# Patient Record
Sex: MALE | Race: WHITE | Employment: OTHER | URBAN - METROPOLITAN AREA
[De-identification: names, ages, dates, MRNs, and addresses within clinical notes are randomized per-mention and may not be internally consistent; named-entity substitution may affect disease eponyms.]

---

## 2019-05-16 ENCOUNTER — TRANSFERRED RECORDS (OUTPATIENT)
Dept: HEALTH INFORMATION MANAGEMENT | Facility: CLINIC | Age: 79
End: 2019-05-16

## 2019-05-17 ENCOUNTER — MEDICAL CORRESPONDENCE (OUTPATIENT)
Dept: HEALTH INFORMATION MANAGEMENT | Facility: CLINIC | Age: 79
End: 2019-05-17

## 2019-05-21 ENCOUNTER — TRANSFERRED RECORDS (OUTPATIENT)
Dept: HEALTH INFORMATION MANAGEMENT | Facility: CLINIC | Age: 79
End: 2019-05-21

## 2019-05-28 ENCOUNTER — TRANSFERRED RECORDS (OUTPATIENT)
Dept: HEALTH INFORMATION MANAGEMENT | Facility: CLINIC | Age: 79
End: 2019-05-28

## 2019-05-29 ENCOUNTER — TRANSFERRED RECORDS (OUTPATIENT)
Dept: HEALTH INFORMATION MANAGEMENT | Facility: CLINIC | Age: 79
End: 2019-05-29

## 2019-05-30 NOTE — TELEPHONE ENCOUNTER
ONCOLOGY INTAKE: Records Information      APPT INFORMATION:  Referring provider:  Dr. Marisa Chaidez  Referring provider s clinic:  [facility]  Reason for visit/diagnosis:  Colon cancer  Has patient been notified of appointment date and time?: yes - his care coordinator made the appointment    RECORDS INFORMATION:  Were the records received with the referral (via [employer])? No - but the referral and records will be coming soon    Has patient been seen for any external appt for this diagnosis? yes    If yes, where? [facility]    Has patient had any imaging or procedures outside of [place] for this condition? yes      If Yes, where? [facility]

## 2019-05-31 NOTE — TELEPHONE ENCOUNTER
RECORDS STATUS - ALL OTHER DIAGNOSIS      RECORDS RECEIVED FROM: [facility] [place]   DATE RECEIVED: 06/10/2019   NOTES STATUS DETAILS   OFFICE NOTE from referring provider Received  Sent to scanning   OFFICE NOTE from medical oncologist yes Sent to scanning   DISCHARGE SUMMARY from hospital na    DISCHARGE REPORT from the ER na    OPERATIVE REPORT na    MEDICATION LIST yes Sent to scanning   CLINICAL TRIAL TREATMENTS TO DATE na    LABS yes Sent to scanning   PATHOLOGY REPORTS pending Slides requested reports sent to scanning   ANYTHING RELATED TO DIAGNOSIS na    GENONOMIC TESTING na    TYPE:     IMAGING (NEED IMAGES & REPORT) yes    CT SCANS     MRI     MAMMO     ULTRASOUND     PET yes Resolved in pacs     Action [place]   Action Taken Request sent to [facility]

## 2019-06-10 ENCOUNTER — PRE VISIT (OUTPATIENT)
Dept: ONCOLOGY | Facility: CLINIC | Age: 79
End: 2019-06-10

## 2019-06-10 ENCOUNTER — ONCOLOGY VISIT (OUTPATIENT)
Dept: ONCOLOGY | Facility: CLINIC | Age: 79
End: 2019-06-10
Attending: INTERNAL MEDICINE
Payer: COMMERCIAL

## 2019-06-10 VITALS
TEMPERATURE: 97.5 F | OXYGEN SATURATION: 95 % | HEART RATE: 75 BPM | DIASTOLIC BLOOD PRESSURE: 72 MMHG | WEIGHT: 220 LBS | SYSTOLIC BLOOD PRESSURE: 145 MMHG | RESPIRATION RATE: 16 BRPM | BODY MASS INDEX: 30.8 KG/M2 | HEIGHT: 71 IN

## 2019-06-10 DIAGNOSIS — C79.9 METASTASIS FROM RECTAL CANCER (H): Primary | ICD-10-CM

## 2019-06-10 DIAGNOSIS — C20 METASTASIS FROM RECTAL CANCER (H): Primary | ICD-10-CM

## 2019-06-10 PROCEDURE — 99205 OFFICE O/P NEW HI 60 MIN: CPT | Mod: ZP | Performed by: INTERNAL MEDICINE

## 2019-06-10 PROCEDURE — G0463 HOSPITAL OUTPT CLINIC VISIT: HCPCS | Mod: ZF

## 2019-06-10 RX ORDER — ASPIRIN 325 MG
325 TABLET ORAL
COMMUNITY
Start: 2018-03-27

## 2019-06-10 RX ORDER — CLOPIDOGREL BISULFATE 75 MG/1
75 TABLET ORAL
COMMUNITY
Start: 2018-03-27

## 2019-06-10 RX ORDER — SIMVASTATIN 40 MG
80 TABLET ORAL
COMMUNITY

## 2019-06-10 RX ORDER — TRAMADOL HYDROCHLORIDE 50 MG/1
50 TABLET ORAL EVERY 6 HOURS PRN
COMMUNITY

## 2019-06-10 RX ORDER — NAPROXEN SODIUM 220 MG
220 TABLET ORAL
COMMUNITY

## 2019-06-10 RX ORDER — AMLODIPINE BESYLATE 10 MG/1
10 TABLET ORAL
COMMUNITY

## 2019-06-10 SDOH — HEALTH STABILITY: MENTAL HEALTH: HOW OFTEN DO YOU HAVE A DRINK CONTAINING ALCOHOL?: 2-3 TIMES A WEEK

## 2019-06-10 SDOH — HEALTH STABILITY: MENTAL HEALTH: HOW MANY STANDARD DRINKS CONTAINING ALCOHOL DO YOU HAVE ON A TYPICAL DAY?: 1 OR 2

## 2019-06-10 SDOH — HEALTH STABILITY: MENTAL HEALTH: HOW OFTEN DO YOU HAVE 6 OR MORE DRINKS ON ONE OCCASION?: NEVER

## 2019-06-10 ASSESSMENT — PAIN SCALES - GENERAL: PAINLEVEL: MILD PAIN (3)

## 2019-06-10 ASSESSMENT — MIFFLIN-ST. JEOR: SCORE: 1725.04

## 2019-06-10 NOTE — NURSING NOTE
"Oncology Rooming Note    Cindy 10, 2019 11:37 AM   Luis Garcia is a 79 year old male who presents for:    Chief Complaint   Patient presents with     Oncology Clinic Visit     New Patient Visit for Colon Cancer     Initial Vitals: /72 (BP Location: Left arm, Patient Position: Chair, Cuff Size: Adult Regular)   Pulse 75   Temp 97.5  F (36.4  C) (Oral)   Resp 16   Ht 1.803 m (5' 11\")   Wt 99.8 kg (220 lb)   SpO2 95%   BMI 30.68 kg/m   Estimated body mass index is 30.68 kg/m  as calculated from the following:    Height as of this encounter: 1.803 m (5' 11\").    Weight as of this encounter: 99.8 kg (220 lb). Body surface area is 2.24 meters squared.  Mild Pain (3) Comment: Data Unavailable   No LMP for male patient.  Allergies reviewed: Yes  Medications reviewed: Yes    Medications: Medication refills not needed today.  Pharmacy name entered into [facility]: [facility] [address] [place] [address]    Clinical concerns: Here today with wife & daughters.  They want a second opinion on the diagnosis & second opinion on PET scan.  [facility] & [facility] have looked at scans & have somewhat similar diagnosis.  They are wondering if a liver biopsy is needed.  Two months ago patient had a \"little stroke\" & he was told he cannot be put to sleep for any biopsy or procedure.  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  He & his family are looking for guidance, diagnosis, & a plan of care.   Change was notified.      Carla Mcgee RN            "

## 2019-06-10 NOTE — PROGRESS NOTES
[facility]    Hematology/Oncology New Patient Note      Today's Date: 06/10/19    Reason for Consult: metastatic rectal cancer      HISTORY OF PRESENT ILLNESS: Luis Garcia is a 79 year old male who presents with metastatic rectal cancer.  He is here for a second opinion.  He has met with Dr. Marisa Chaidez at the [facility] Oncology service.  He has also been seen by pulmonary at the [facility].      On 4/2/19, he went to the [facility] ED with complaints of rectal/sacral pain/constipation.   Rectal exam revealed a mass.  Colonoscopy was recommended, which was done on 5/16/19; he showed a 10 cm mass at the rectum 1-15 cm from the anorectal verge.  Pathology on the rectal mass showed poorly differentiated carcinoma; no loss of nuclear expression of MMR proteins; low probability of microsatellite instability-high; LUKE mutation detected; BRAF mutation not detected.  CEA was 3.17.  On 5/21/19, he presented to urgent with rectal pain.  He was discharged with Tramadol.  CT c/a/p showed marked irregular wall thickening at the rectum, with numerous lymph nodes around the rectum.  There was a 12 cm hypodense lesion with ill-defined margins in the right lobe of the liver.  Innumerable nodules scattered throughout both lungs involving all lobes were seen.  There was also a 1 cm left adrenal gland nodule.  PET-CT on 5/28/19 showed a large rectal mass measuring 6 cm with marked metabolic activity in multiple perirectal lymph nodes measuring up to 9 mm also with increased metabolic activity consistent with lymph node metastases.  Findings consistent with hepatic and pulmonary metastases with likely a small left adrenal metastasis.    He saw Dr. Chaidez at the [facility].  He also saw pulmonary at the [facility].  It was felt a liver biopsy could be biopsied to prove metastatic disease.  However, patient and family are reluctant to do additional procedures, considering  his co-morbidities.  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  He has discussed capecitabine treatment with palliative intent with his oncologist.      Currently, Luis feels okay.  His rectal pain is controlled with Aleve and Tramadol.  He denies rectal bleeding symptoms.  He has a bowel movement daily.  He is eating and drinking okay.        REVIEW OF SYSTEMS:   14 point ROS was reviewed and is negative other than as noted above in HPI.       HOME MEDICATIONS:  Current Outpatient Medications   Medication Sig Dispense Refill     amLODIPine (NORVASC) 10 MG tablet Take 10 mg by mouth       aspirin (ASA) 325 MG tablet Take 325 mg by mouth       clopidogrel (PLAVIX) 75 MG tablet Take 75 mg by mouth       naproxen sodium (ANAPROX) 220 MG tablet Take 220 mg by mouth       simvastatin (ZOCOR) 40 MG tablet Take 80 mg by mouth       traMADol (ULTRAM) 50 MG tablet Take 50 mg by mouth every 6 hours as needed for severe pain           ALLERGIES:  No Known Allergies      PAST MEDICAL HISTORY:  Obesity  Bilateral posterior vitreous detachment  Cataract  Nonexudative age-related macular degeneration  Mixed conductive and sensorineural hearing loss, bilateral  Dry eyes  Presbyopia  Depressive disorder  Hypercholesterolemia  Tinnitus  Diabetes mellitus type 2  Transient cerebral ischemia  Benign essential hypertension  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      SOCIAL HISTORY:  Social History     Socioeconomic History     Marital status:      Spouse name: Not on file     Number of children: Not on file     Years of education: Not on file     Highest education level: Not on file   Occupational History     Not on file   Social Needs     Financial resource strain: Not on file     Food  "insecurity:     Worry: Not on file     Inability: Not on file     Transportation needs:     Medical: Not on file     Non-medical: Not on file   Tobacco Use     Smoking status: Former Smoker     Types: Cigarettes     Last attempt to quit: 6/10/1969     Years since quittin.0     Smokeless tobacco: Never Used   Substance and Sexual Activity     Alcohol use: Yes     Frequency: 2-3 times a week     Drinks per session: 1 or 2     Binge frequency: Never     Drug use: Never     Sexual activity: Not on file   Lifestyle     Physical activity:     Days per week: Not on file     Minutes per session: Not on file     Stress: Not on file   Relationships     Social connections:     Talks on phone: Not on file     Gets together: Not on file     Attends [employer] service: Not on file     Active member of club or organization: Not on file     Attends meetings of clubs or organizations: Not on file     Relationship status: Not on file     Intimate partner violence:     Fear of current or ex partner: Not on file     Emotionally abused: Not on file     Physically abused: Not on file     Forced sexual activity: Not on file   Other Topics Concern     Not on file   Social History Narrative     Not on file         FAMILY HISTORY:  Brother  with chronic leukemia, bladder cancer, and a head tumor.  Sister  with cervical cancer.        PHYSICAL EXAM:  Vital signs:  /72 (BP Location: Left arm, Patient Position: Chair, Cuff Size: Adult Regular)   Pulse 75   Temp 97.5  F (36.4  C) (Oral)   Resp 16   Ht 1.803 m (5' 11\")   Wt 99.8 kg (220 lb)   SpO2 95%   BMI 30.68 kg/m     ECO  GENERAL/CONSTITUTIONAL: No acute distress. Accompanied by wife, daughter, daughter-in-law.  EYES: No scleral icterus.  RESPIRATORY: Clear to auscultation bilaterally. No crackles or wheezing.   CARDIOVASCULAR: Regular rate and rhythm without murmurs, gallops, or rubs.  GASTROINTESTINAL: No tenderness. The patient has normal bowel sounds. No " guarding.    MUSCULOSKELETAL: Warm and well-perfused.  NEUROLOGIC: Alert, oriented, answers questions appropriately.  INTEGUMENTARY: No jaundice.      LABS:  None today.    [facility]      PATHOLOGY:  Rectal mass, biopsy 5/17/19:  -poorly differentiated carcinoma  -no loss of nuclear expression of MMR proteins; low probability of microsatellite instability-high  -LUKE mutation detected  -BRAF mutation not detected      IMAGING:  CT c/a/p 5/21/19:  Rectal mass with adjacent adenopathy.  Indeterminate lesions of the liver, left adrenal, and lungs that could represent metastases.      PET-CT 5/28/19:  FINDINGS:  NECK:    There is no abnormal metabolic activity within the neck.  There are no enlarged  cervical lymph nodes.        CHEST:    There are few scattered pulmonary nodules within the lungs.  The 2 largest  demonstrate increased metabolic activity consistent with pulmonary metastases.  For reference, the largest within the left upper lobe measures 8 mm (series 3,  image 107) with a maximum SUV of 5.67.  The largest on the right is in the  medial right upper lobe (image 131) with a maximum SUV of 3.12 and measures 9  mm.  A few other pulmonary nodules measure less than 4 mm under too small to  characterize by PET.  There is no supraclavicular, axillary or mediastinal  lymphadenopathy.    There also few pulmonary nodules which are likely developing calcification and  may represent old granulomatous disease.  There is mild vascular  atherosclerotic calcifications including atherosclerotic calcification of the  left anterior descending coronary artery.        ABDOMEN/PELVIS:    There is focally increased metabolic activity corresponding with a 1.4 cm  hypoattenuating lesion within the right hepatic lobe (series 3 image 180) with  a maximum SUV of 8.21.  There is otherwise heterogeneous uptake within the  liver and smaller lesions are not completely excluded but are not visible on  the noncontrast  CT.  The left adrenal gland contains an 8 mm indeterminate  nodule but with a small focus of increased metabolic activity with a max SUV of  4.34 (series 3, image 202).    As seen on prior CT there is an irregular rectal mass measuring at least 6 cm  with a maximum SUV of 22.47.  In addition there are multiple bilateral  perirectal lymph nodes.  For reference, one of the largest is at the 4 o'clock  left perirectal position measuring 1.7 x 1.1 cm with a maximum SUV of 6.81  (series 3, image 338).  More superiorly there is another left perirectal lymph  node approximately 3 o'clock position measuring 1.4 x 1.3 cm with a maximum SUV  of 3.72 (series 3, image 321).  Multiple smaller perirectal lymph nodes are  seen.    There is moderate vascular atherosclerotic calcification.  There is colonic  diverticulosis.        MUSCULOSKELETAL:    There is no focal abnormal metabolic activity within osseous structures.  There  are no suspicious osteolytic or osteoblastic lesions.    There are multilevel degenerative changes of the spine.      IMPRESSION:      1. There is a large rectal mass measuring 6 cm with marked metabolic activity  in multiple perirectal lymph nodes measuring up to 9 mm also with increased  metabolic activity consistent with lymph node metastases.  2. Findings consistent with hepatic and pulmonary metastases with likely a  small left adrenal metastasis.  Recommend follow-up with a left adrenal nodule  on subsequent examinations.  3. Vascular atherosclerotic calcifications including atherosclerotic  calcification of the left anterior descending coronary artery.      ASSESSMENT/PLAN:  Luis Garcia is a 79 year old male with:    1) Stage IV rectal carcinoma: Rectal mass is biopsy-proven to be poorly differentiated carcinoma.  MMR intact.  LUKE mutated.  We reviewed his pathology and imaging from outside hospital.  There is imaging concerning for liver, lung, and adrenal metastases.   He has met with  pulmonary at the [place], and liver biopsy was discussed to confirm metastatic disease. Patient and his family are reluctant to do this due to his co-morbidities.  I discussed that oftentimes a biopsy is preferable to confirm metastatic disease, but if the imaging is convincing enough, we can sometimes forgo it.  They are comfortable forgoing the biopsy for now and proceeding with treatment for metastatic disease.  With multiple organ involvement for metastatic disease, he is likely not a candidate for surgery.  Chemotherapy was discussed with palliative intent.  With LUKE mutation and MMR intact, he would not be candidate for EGFR therapy or immunotherapy.  Considering his co-morbidities and functional status, he has discussed capecitabine with his oncologist, which I agree with.  Avastin will be held for now due to possible obstruction/perforation once he starts treatment.  He currently has no symptoms of obstruction.  -he will follow-up with his oncologist at the [facility]  -[facility]    2) Rectal pain: this is controlled, per patient. He takes Aleve and Tramadol.  He denies rectal bleeding.      I spent a total of 60 minutes with the patient, with over >50% of the time in counseling and/or coordination of care.      Tiffanie Amin MD  Hematology/Oncology  [facility]

## 2019-06-10 NOTE — LETTER
6/10/2019       RE: Luis Garcia  [address] [facility] [address]     Dear Colleague,    Thank you for referring your patient, Luis Garcia, to the [facility]. Please see a copy of my visit note below.    [facility]    Hematology/Oncology New Patient Note      Today's Date: 06/10/19    Reason for Consult: metastatic rectal cancer      HISTORY OF PRESENT ILLNESS: Luis Garcia is a 79 year old male who presents with metastatic rectal cancer.  He is here for a second opinion.  He has met with Dr. Marisa Chaidez at the [facility] Oncology service.  He has also been seen by pulmonary at the [facility].      On 4/2/19, he went to the [facility] ED with complaints of rectal/sacral pain/constipation.   Rectal exam revealed a mass.  Colonoscopy was recommended, which was done on 5/16/19; he showed a 10 cm mass at the rectum 1-15 cm from the anorectal verge.  Pathology on the rectal mass showed poorly differentiated carcinoma; no loss of nuclear expression of MMR proteins; low probability of microsatellite instability-high; LUKE mutation detected; BRAF mutation not detected.  CEA was 3.17.  On 5/21/19, he presented to urgent with rectal pain.  He was discharged with Tramadol.  CT c/a/p showed marked irregular wall thickening at the rectum, with numerous lymph nodes around the rectum.  There was a 12 cm hypodense lesion with ill-defined margins in the right lobe of the liver.  Innumerable nodules scattered throughout both lungs involving all lobes were seen.  There was also a 1 cm left adrenal gland nodule.  PET-CT on 5/28/19 showed a large rectal mass measuring 6 cm with marked metabolic activity in multiple perirectal lymph nodes measuring up to 9 mm also with increased metabolic activity consistent with lymph node metastases.  Findings consistent with hepatic and pulmonary metastases with likely a small left adrenal metastasis.    He  saw Dr. Chaidez at the [facility].  He also saw pulmonary at the [facility].  It was felt a liver biopsy could be biopsied to prove metastatic disease.  However, patient and family are reluctant to do additional procedures, considering his co-morbidities.  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  He has discussed capecitabine treatment with palliative intent with his oncologist.      Currently, Luis feels okay.  His rectal pain is controlled with Aleve and Tramadol.  He denies rectal bleeding symptoms.  He has a bowel movement daily.  He is eating and drinking okay.    REVIEW OF SYSTEMS:   14 point ROS was reviewed and is negative other than as noted above in HPI.       HOME MEDICATIONS:  Current Outpatient Medications   Medication Sig Dispense Refill     amLODIPine (NORVASC) 10 MG tablet Take 10 mg by mouth       aspirin (ASA) 325 MG tablet Take 325 mg by mouth       clopidogrel (PLAVIX) 75 MG tablet Take 75 mg by mouth       naproxen sodium (ANAPROX) 220 MG tablet Take 220 mg by mouth       simvastatin (ZOCOR) 40 MG tablet Take 80 mg by mouth       traMADol (ULTRAM) 50 MG tablet Take 50 mg by mouth every 6 hours as needed for severe pain           ALLERGIES:  No Known Allergies      PAST MEDICAL HISTORY:  Obesity  Bilateral posterior vitreous detachment  Cataract  Nonexudative age-related macular degeneration  Mixed conductive and sensorineural hearing loss, bilateral  Dry eyes  Presbyopia  Depressive disorder  Hypercholesterolemia  Tinnitus  Diabetes mellitus type 2  Transient cerebral ischemia  Benign essential hypertension  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      SOCIAL HISTORY:  Social History     Socioeconomic History     Marital status:      Spouse name: Not  "on file     Number of children: Not on file     Years of education: Not on file     Highest education level: Not on file   Occupational History     Not on file   Social Needs     Financial resource strain: Not on file     Food insecurity:     Worry: Not on file     Inability: Not on file     Transportation needs:     Medical: Not on file     Non-medical: Not on file   Tobacco Use     Smoking status: Former Smoker     Types: Cigarettes     Last attempt to quit: 6/10/1969     Years since quittin.0     Smokeless tobacco: Never Used   Substance and Sexual Activity     Alcohol use: Yes     Frequency: 2-3 times a week     Drinks per session: 1 or 2     Binge frequency: Never     Drug use: Never     Sexual activity: Not on file   Lifestyle     Physical activity:     Days per week: Not on file     Minutes per session: Not on file     Stress: Not on file   Relationships     Social connections:     Talks on phone: Not on file     Gets together: Not on file     Attends Mu-ism service: Not on file     Active member of club or organization: Not on file     Attends meetings of clubs or organizations: Not on file     Relationship status: Not on file     Intimate partner violence:     Fear of current or ex partner: Not on file     Emotionally abused: Not on file     Physically abused: Not on file     Forced sexual activity: Not on file   Other Topics Concern     Not on file   Social History Narrative     Not on file         FAMILY HISTORY:  Brother  with chronic leukemia, bladder cancer, and a head tumor.  Sister  with cervical cancer.        PHYSICAL EXAM:  Vital signs:  /72 (BP Location: Left arm, Patient Position: Chair, Cuff Size: Adult Regular)   Pulse 75   Temp 97.5  F (36.4  C) (Oral)   Resp 16   Ht 1.803 m (5' 11\")   Wt 99.8 kg (220 lb)   SpO2 95%   BMI 30.68 kg/m      ECO  GENERAL/CONSTITUTIONAL: No acute distress. Accompanied by wife, daughter, daughter-in-law.  EYES: No scleral " icterus.  RESPIRATORY: Clear to auscultation bilaterally. No crackles or wheezing.   CARDIOVASCULAR: Regular rate and rhythm without murmurs, gallops, or rubs.  GASTROINTESTINAL: No tenderness. The patient has normal bowel sounds. No guarding.    MUSCULOSKELETAL: Warm and well-perfused.  NEUROLOGIC: Alert, oriented, answers questions appropriately.  INTEGUMENTARY: No jaundice.      LABS:  None today.    [facility]      PATHOLOGY:  Rectal mass, biopsy 5/17/19:  -poorly differentiated carcinoma  -no loss of nuclear expression of MMR proteins; low probability of microsatellite instability-high  -LUKE mutation detected  -BRAF mutation not detected      IMAGING:  CT c/a/p 5/21/19:  Rectal mass with adjacent adenopathy.  Indeterminate lesions of the liver, left adrenal, and lungs that could represent metastases.      PET-CT 5/28/19:  FINDINGS:  NECK:    There is no abnormal metabolic activity within the neck.  There are no enlarged  cervical lymph nodes.        CHEST:    There are few scattered pulmonary nodules within the lungs.  The 2 largest  demonstrate increased metabolic activity consistent with pulmonary metastases.  For reference, the largest within the left upper lobe measures 8 mm (series 3,  image 107) with a maximum SUV of 5.67.  The largest on the right is in the  medial right upper lobe (image 131) with a maximum SUV of 3.12 and measures 9  mm.  A few other pulmonary nodules measure less than 4 mm under too small to  characterize by PET.  There is no supraclavicular, axillary or mediastinal  lymphadenopathy.    There also few pulmonary nodules which are likely developing calcification and  may represent old granulomatous disease.  There is mild vascular  atherosclerotic calcifications including atherosclerotic calcification of the  left anterior descending coronary artery.        ABDOMEN/PELVIS:    There is focally increased metabolic activity corresponding with a 1.4 cm  hypoattenuating  lesion within the right hepatic lobe (series 3 image 180) with  a maximum SUV of 8.21.  There is otherwise heterogeneous uptake within the  liver and smaller lesions are not completely excluded but are not visible on  the noncontrast CT.  The left adrenal gland contains an 8 mm indeterminate  nodule but with a small focus of increased metabolic activity with a max SUV of  4.34 (series 3, image 202).    As seen on prior CT there is an irregular rectal mass measuring at least 6 cm  with a maximum SUV of 22.47.  In addition there are multiple bilateral  perirectal lymph nodes.  For reference, one of the largest is at the 4 o'clock  left perirectal position measuring 1.7 x 1.1 cm with a maximum SUV of 6.81  (series 3, image 338).  More superiorly there is another left perirectal lymph  node approximately 3 o'clock position measuring 1.4 x 1.3 cm with a maximum SUV  of 3.72 (series 3, image 321).  Multiple smaller perirectal lymph nodes are  seen.    There is moderate vascular atherosclerotic calcification.  There is colonic  diverticulosis.        MUSCULOSKELETAL:    There is no focal abnormal metabolic activity within osseous structures.  There  are no suspicious osteolytic or osteoblastic lesions.    There are multilevel degenerative changes of the spine.      IMPRESSION:      1. There is a large rectal mass measuring 6 cm with marked metabolic activity  in multiple perirectal lymph nodes measuring up to 9 mm also with increased  metabolic activity consistent with lymph node metastases.  2. Findings consistent with hepatic and pulmonary metastases with likely a  small left adrenal metastasis.  Recommend follow-up with a left adrenal nodule  on subsequent examinations.  3. Vascular atherosclerotic calcifications including atherosclerotic  calcification of the left anterior descending coronary artery.      ASSESSMENT/PLAN:  Luis Garcia is a 79 year old male with:    1) Stage IV rectal carcinoma: Rectal mass is  biopsy-proven to be poorly differentiated carcinoma.  MMR intact.  LUKE mutated.  We reviewed his pathology and imaging from outside hospital.  There is imaging concerning for liver, lung, and adrenal metastases.   He has met with pulmonary at the [facility], and liver biopsy was discussed to confirm metastatic disease. Patient and his family are reluctant to do this due to his co-morbidities.  I discussed that oftentimes a biopsy is preferable to confirm metastatic disease, but if the imaging is convincing enough, we can sometimes forgo it.  They are comfortable forgoing the biopsy for now and proceeding with treatment for metastatic disease.  With multiple organ involvement for metastatic disease, he is likely not a candidate for surgery.  Chemotherapy was discussed with palliative intent.  With LUKE mutation and MMR intact, he would not be candidate for EGFR therapy or immunotherapy.  Considering his co-morbidities and functional status, he has discussed capecitabine with his oncologist, which I agree with.  Avastin will be held for now due to possible obstruction/perforation once he starts treatment.  He currently has no symptoms of obstruction.  -he will follow-up with his oncologist at the [facility]  -[facility]    2) Rectal pain: this is controlled, per patient. He takes Aleve and Tramadol.  He denies rectal bleeding.      I spent a total of 60 minutes with the patient, with over >50% of the time in counseling and/or coordination of care.      Tiffanie Amin MD  Hematology/Oncology  [facility]

## 2020-03-11 ENCOUNTER — HEALTH MAINTENANCE LETTER (OUTPATIENT)
Age: 80
End: 2020-03-11

## 2021-01-03 ENCOUNTER — HEALTH MAINTENANCE LETTER (OUTPATIENT)
Age: 81
End: 2021-01-03

## 2021-04-25 ENCOUNTER — HEALTH MAINTENANCE LETTER (OUTPATIENT)
Age: 81
End: 2021-04-25

## 2021-10-10 ENCOUNTER — HEALTH MAINTENANCE LETTER (OUTPATIENT)
Age: 81
End: 2021-10-10